# Patient Record
Sex: FEMALE | Race: WHITE | Employment: FULL TIME | URBAN - METROPOLITAN AREA
[De-identification: names, ages, dates, MRNs, and addresses within clinical notes are randomized per-mention and may not be internally consistent; named-entity substitution may affect disease eponyms.]

---

## 2017-03-30 ENCOUNTER — OFFICE VISIT (OUTPATIENT)
Dept: FAMILY MEDICINE | Facility: CLINIC | Age: 52
End: 2017-03-30
Payer: COMMERCIAL

## 2017-03-30 VITALS
DIASTOLIC BLOOD PRESSURE: 86 MMHG | BODY MASS INDEX: 23.74 KG/M2 | SYSTOLIC BLOOD PRESSURE: 124 MMHG | HEIGHT: 62 IN | HEART RATE: 62 BPM | WEIGHT: 129 LBS | TEMPERATURE: 98 F | OXYGEN SATURATION: 99 %

## 2017-03-30 DIAGNOSIS — L98.9 SKIN LESION: Primary | ICD-10-CM

## 2017-03-30 PROCEDURE — 99213 OFFICE O/P EST LOW 20 MIN: CPT | Performed by: PHYSICIAN ASSISTANT

## 2017-03-30 NOTE — PROGRESS NOTES
SUBJECTIVE:                                                    Chloe Elliott is a 51 year old female who presents to clinic today for the following health issues:      Skin lesion     Onset: for a long time now per pt    Description:   Location: R back shoulder region, has had sunburns in that area  Character: round  Itching (Pruritis): no     Progression of Symptoms:  same    Accompanying Signs & Symptoms:  Fever: no   Body aches or joint pain: no   Sore throat symptoms: no   Recent cold symptoms: no    History:   Previous similar rash: YES    Precipitating factors:   Exposure to similar rash: no   New exposures: None   Recent travel: no     Alleviating factors:  none     Therapies Tried and outcome: none      Was seen in august and had frozen with liquid nitrogen.  That lesion went away.  Now has a different lesion that she would like me to check, does not bother her.   Possibly has grown she is not sure.   No fevers, chills, or weight loss.   Admits to a lot of sun exposure in her life. Only wears sunscreen on face.           Problem list and histories reviewed & adjusted, as indicated.  Additional history: as documented    Patient Active Problem List   Diagnosis     CARDIOVASCULAR SCREENING; LDL GOAL LESS THAN 160     Status post LASIK surgery     Past Surgical History:   Procedure Laterality Date     BREAST BIOPSY, RT/LT  2008    right - negative     COLONOSCOPY WITH CO2 INSUFFLATION N/A 10/27/2014    neg except min sigmoid diverticuli     HC CONIZATION CERVIX,KNIFE/LASER  1990     LASIK  2001       Social History   Substance Use Topics     Smoking status: Never Smoker     Smokeless tobacco: Never Used      Comment: Lives in smoke free household     Alcohol use 2.5 oz/week     5 drink(s) per week     Family History   Problem Relation Age of Onset     Cardiovascular Mother      CANCER Father      stomach     GASTROINTESTINAL DISEASE Father      Pyelonephritis     Breast Cancer Paternal Grandmother      DIABETES  "Paternal Grandmother      Cancer - colorectal No family hx of      Prostate Cancer No family hx of      Hypertension No family hx of      CEREBROVASCULAR DISEASE No family hx of      Thyroid Disease No family hx of      Glaucoma No family hx of      Macular Degeneration No family hx of      CANCER Maternal Uncle      stomach and bone     DIABETES Maternal Aunt      DIABETES Maternal Uncle      DIABETES Maternal Grandmother      DIABETES Maternal Grandfather      DIABETES Paternal Grandfather      Alcohol/Drug Sister      Alcoholism     GASTROINTESTINAL DISEASE Brother      peptic ulcer disease         No current outpatient prescriptions on file.     No Known Allergies    ROS:  Constitutional, HEENT, cardiovascular, pulmonary, gi and gu systems are negative, except as otherwise noted.    OBJECTIVE:                                                    /86  Pulse 62  Temp 98  F (36.7  C) (Oral)  Ht 5' 2\" (1.575 m)  Wt 129 lb (58.5 kg)  LMP 05/27/2014  SpO2 99%  Breastfeeding? No  BMI 23.59 kg/m2  Body mass index is 23.59 kg/(m^2).  GENERAL: healthy, alert and no distress  NECK: no adenopathy, no asymmetry, masses, or scars and thyroid normal to palpation  RESP: lungs clear to auscultation - no rales, rhonchi or wheezes  CV: regular rate and rhythm, normal S1 S2, no S3 or S4, no murmur, click or rub, no peripheral edema and peripheral pulses strong  MS: no gross musculoskeletal defects noted, no edema  SKIN: R posterior shoulder- about 9 mm x 4 mm skin lesion that looks more like a flat sunspot but does have some darker spots in the center.   Lots of freckles elsewhere but not many nevi.          ASSESSMENT/PLAN:                                                    ASSESSMENT / PLAN:  (L98.9) Skin lesion  (primary encounter diagnosis)  Comment: would recommend derm consult and ? Removal  Plan: DERMATOLOGY REFERRAL              Gale Morales PA-C  [facility]      "

## 2017-03-30 NOTE — MR AVS SNAPSHOT
After Visit Summary   3/30/2017    Chloe Elliott    MRN: [id number]           Patient Information     Date Of Birth          1965        Visit Information        Provider Department      3/30/2017 8:40 AM Gale Morales PA-C [facility]        Today's Diagnoses     Skin lesion    -  1      Care Instructions    Schedule pap    Schedule check up with derm        Follow-ups after your visit        Additional Services     DERMATOLOGY REFERRAL       Your provider has referred you to: [facility] - Earlene Hernandez [phone]   [email or web address]  Skin lesion on shoulder and all over skin check please    Please be aware that coverage of these services is subject to the terms and limitations of your health insurance plan.  Call member services at your health plan with any benefit or coverage questions.      Please bring the following with you to your appointment:    (1) Any X-Rays, CTs or MRIs which have been performed.  Contact the facility where they were done to arrange for  prior to your scheduled appointment.    (2) List of current medications  (3) This referral request   (4) Any documents/labs given to you for this referral                  Who to contact     If you have questions or need follow up information about today's clinic visit or your schedule please contact [facility] directly at [phone].  Normal or non-critical lab and imaging results will be communicated to you by MyChart, letter or phone within 4 business days after the clinic has received the results. If you do not hear from us within 7 days, please contact the clinic through MyChart or phone. If you have a critical or abnormal lab result, we will notify you by phone as soon as possible.  Submit refill requests through [employer] or call your pharmacy and they will forward the refill request to us. Please allow 3 business days for your refill to be  "completed.          Additional Information About Your Visit        Care EveryWhere ID     This is your Care EveryWhere ID. This could be used by other organizations to access your [place] medical records  [id number]        Your Vitals Were     Pulse Temperature Height Last Period Pulse Oximetry Breastfeeding?    62 98  F (36.7  C) (Oral) 5' 2\" (1.575 m) 05/27/2014 99% No    BMI (Body Mass Index)                   23.59 kg/m2            Blood Pressure from Last 3 Encounters:   03/30/17 124/86   08/08/16 128/62   07/27/15 126/77    Weight from Last 3 Encounters:   03/30/17 129 lb (58.5 kg)   08/08/16 129 lb (58.5 kg)   07/27/15 126 lb (57.2 kg)              We Performed the Following     DERMATOLOGY REFERRAL        Primary Care Provider Office Phone # Fax #    [facility] [phone] [address] [facility]. [facility] [address]        Thank you!     Thank you for choosing [facility]  for your care. Our goal is always to provide you with excellent care. Hearing back from our patients is one way we can continue to improve our services. Please take a few minutes to complete the written survey that you may receive in the mail after your visit with us. Thank you!             Your Updated Medication List - Protect others around you: Learn how to safely use, store and throw away your medicines at www.disposemymeds.org.      Notice  As of 3/30/2017  9:04 AM    You have not been prescribed any medications.      "

## 2017-03-30 NOTE — Clinical Note
Please abstract the following data from this visit with this patient into the appropriate field in Epic:  Mammogram done on this date: 06/01/2016 (approximately), by this group: [facility] results were normal.

## 2017-03-30 NOTE — NURSING NOTE
"Chief Complaint   Patient presents with     Derm Problem     check skin per pt x for a long time now       Initial /86  Pulse 62  Temp 98  F (36.7  C) (Oral)  Ht 5' 2\" (1.575 m)  Wt 129 lb (58.5 kg)  LMP 05/27/2014  SpO2 99%  Breastfeeding? No  BMI 23.59 kg/m2 Estimated body mass index is 23.59 kg/(m^2) as calculated from the following:    Height as of this encounter: 5' 2\" (1.575 m).    Weight as of this encounter: 129 lb (58.5 kg).  Medication Reconciliation: complete      Papo Barros MA    "

## 2017-06-19 ENCOUNTER — OFFICE VISIT (OUTPATIENT)
Dept: OBGYN | Facility: CLINIC | Age: 52
End: 2017-06-19
Payer: COMMERCIAL

## 2017-06-19 VITALS
DIASTOLIC BLOOD PRESSURE: 82 MMHG | SYSTOLIC BLOOD PRESSURE: 148 MMHG | HEART RATE: 71 BPM | TEMPERATURE: 97 F | WEIGHT: 130 LBS | BODY MASS INDEX: 23.92 KG/M2 | HEIGHT: 62 IN

## 2017-06-19 DIAGNOSIS — Z13.220 LIPID SCREENING: ICD-10-CM

## 2017-06-19 DIAGNOSIS — R41.9 COGNITIVE COMPLAINTS: ICD-10-CM

## 2017-06-19 DIAGNOSIS — Z01.419 ENCOUNTER FOR GYNECOLOGICAL EXAMINATION WITHOUT ABNORMAL FINDING: Primary | ICD-10-CM

## 2017-06-19 DIAGNOSIS — Z13.1 SCREENING FOR DIABETES MELLITUS: ICD-10-CM

## 2017-06-19 DIAGNOSIS — Z12.31 ENCOUNTER FOR SCREENING MAMMOGRAM FOR BREAST CANCER: ICD-10-CM

## 2017-06-19 PROCEDURE — 99396 PREV VISIT EST AGE 40-64: CPT | Performed by: OBSTETRICS & GYNECOLOGY

## 2017-06-19 PROCEDURE — G0145 SCR C/V CYTO,THINLAYER,RESCR: HCPCS | Performed by: OBSTETRICS & GYNECOLOGY

## 2017-06-19 PROCEDURE — 87624 HPV HI-RISK TYP POOLED RSLT: CPT | Performed by: OBSTETRICS & GYNECOLOGY

## 2017-06-19 NOTE — MR AVS SNAPSHOT
After Visit Summary   6/19/2017    Chloe Elliott    MRN: [id number]           Patient Information     Date Of Birth          1965        Visit Information        Provider Department      6/19/2017 2:30 PM Abel Pan MD [facility]        Care Instructions                                                        If you have any questions regarding your visit, Please contact your care team.     [facility]: [phone]    [facility] CLINIC HOURS TELEPHONE NUMBER   Abel Pan M.D.      Marci-    Odilia Sharma-PATRICIA Phillipi-Medical Assistant   Monday-[place]  8:00a.m-4:45 p.m  Wednesday-[place] 8:00a.m-4:45 p.m.  Thursday-[place]  8:00a.m-4:45 p.m.  Friday-[place]  8:00a.m-4:45 p.m. [facility]  [address] [place] [address] [address].  [place], [place] [address] [phone] ask [facility]  [phone] Fax  Imaging [phone]    [facility]  [address] [place] [address] [address].  [place], [place] [address] [phone]    [facility]  [address]  [place], [place] [address] [phone] ask [facility]  [phone] Fax  Imaging [phone]     Urgent Care locations:    [facility] Monday-Friday  5 pm - 9 pm  Saturday and Sunday   9 am - 5 pm    Monday-Friday   11 am - 9 pm  Saturday and Sunday   9 am - 5 pm   [phone]       If you need a medication refill, please contact your pharmacy. Please allow 3 business days for your refill to be completed.  As always, Thank you for trusting us with your healthcare needs!            Follow-ups after your visit        Who to contact     If you have questions or need follow up information about today's clinic visit or your schedule please contact [facility] directly at [phone].  Normal or non-critical lab and imaging results will be communicated to you by [employer]art,  "letter or phone within 4 business days after the clinic has received the results. If you do not hear from us within 7 days, please contact the clinic through [employer] or phone. If you have a critical or abnormal lab result, we will notify you by phone as soon as possible.  Submit refill requests through Cond[employer]i or call your pharmacy and they will forward the refill request to us. Please allow 3 business days for your refill to be completed.          Additional Information About Your Visit        Care EveryWhere ID     This is your Care EveryWhere ID. This could be used by other organizations to access your [place] medical records  [id number]        Your Vitals Were     Pulse Temperature Height Last Period Breastfeeding? BMI (Body Mass Index)    71 97  F (36.1  C) (Oral) 1.575 m (5' 2\") 05/27/2014 No 23.78 kg/m2       Blood Pressure from Last 3 Encounters:   06/19/17 148/82   03/30/17 124/86   08/08/16 128/62    Weight from Last 3 Encounters:   06/19/17 59 kg (130 lb)   03/30/17 58.5 kg (129 lb)   08/08/16 58.5 kg (129 lb)              Today, you had the following     No orders found for display       Primary Care Provider Office Phone # Fax #    [facility] [phone] [address] [facility] [address]        Thank you!     Thank you for choosing [facility]  for your care. Our goal is always to provide you with excellent care. Hearing back from our patients is one way we can continue to improve our services. Please take a few minutes to complete the written survey that you may receive in the mail after your visit with us. Thank you!             Your Updated Medication List - Protect others around you: Learn how to safely use, store and throw away your medicines at www.disposemymeds.org.      Notice  As of 6/19/2017  2:37 PM    You have not been prescribed any medications.      "

## 2017-06-19 NOTE — NURSING NOTE
"Chief Complaint   Patient presents with     Physical     AFE       Initial /82 (BP Location: Right arm, Patient Position: Chair, Cuff Size: Adult Regular)  Pulse 71  Temp 97  F (36.1  C) (Oral)  Ht 1.575 m (5' 2\")  Wt 59 kg (130 lb)  LMP 05/27/2014  Breastfeeding? No  BMI 23.78 kg/m2 Estimated body mass index is 23.78 kg/(m^2) as calculated from the following:    Height as of this encounter: 1.575 m (5' 2\").    Weight as of this encounter: 59 kg (130 lb).  Medication Reconciliation: complete   Valencia Preston CMA      "

## 2017-06-19 NOTE — PATIENT INSTRUCTIONS
If you have any questions regarding your visit, Please contact your care team.     [facility]: [phone]    [facility] CLINIC HOURS TELEPHONE NUMBER   JULISSA Hu-    Odilia Sharma-PATRICIA [place]-Medical Assistant   Monday-[place]  8:00a.m-4:45 p.m  Wednesday-[place] 8:00a.m-4:45 p.m.  Thursday-[place]  8:00a.m-4:45 p.m.  Friday-[place]  8:00a.m-4:45 p.m. [facility]  [address]  [place], [place] [address] [phone] ask [facility]  [phone] Fax  Imaging [phone]    [facility] Labor [facility] Delivery  [address] [place] [address] Dr.  [place], [place] [address] [phone]    [facility]  [address] [place] [address]  [phone] ask [facility]  [phone] Fax  Imaging [phone]     Urgent Care locations:    [place]        [place] Monday-Friday  5 pm - 9 pm  Saturday and Sunday   9 am - 5 pm    Monday-Friday   11 am - 9 pm  Saturday and Sunday   9 am - 5 pm   [phone]       If you need a medication refill, please contact your pharmacy. Please allow 3 business days for your refill to be completed.  As always, Thank you for trusting us with your healthcare needs!

## 2017-06-20 NOTE — PROGRESS NOTES
Chloe Elliott is a 52 year old year old who presents for annual exam. Patient's last menstrual period was 05/27/2014.    HPI: Doing well.  Now in menopause. Had stopped OC and no menses for a yr. Few hot flashes now.   Significant interval changes: none.  Current significant symptoms: none.  Other problems or concerns: some memory lapses or cognitive difficulties at times.   Denies interference in daily activities. Wine 1-2 /d. CAGE neg. Notes sister has alcoholism.   Harder to keep weight under control but actually stable.   Home life and family doing well.     Past medical, obstetrical, surgical, family and social history reviewed and as noted or updated in chart.     Allergies, meds and supplements are as noted or updated in chart.     ROS:     Systems reviewed include constitutional; breast;                 cardiac; respiratory; gastrointestinal; genitourinary;                                musculoskeletal; integumentary; psychological;                                hematologic/lymphatic and endocrine.                  These systems were negative for significant symptoms except                 for the following additional: none ; see HPI.                                Exam:  VS as noted.                  Neck, nodes, breasts, abd and pelvis were                             normal or negative except for, or in particular noting, the following                pertinent findings: none.      Assessment: Gyn exam. Problem List updated.    Plan and Recommendations: Symptoms, problems and concerns reviewed. Health habits and vaccinations reviewed. Calcium/Vitamin D and multi-vitamin supplements instructions given. Breast/skin self-exam awareness. Screening tests including limitations discussed. Follow-up visits discussed. See orders. Future fasting labs. Follow BP. RTC 1 year.    Chloe was seen today for physical.    Diagnoses and all orders for this visit:    Encounter for gynecological examination without abnormal  finding  -     Pap imaged thin layer screen with HPV - recommended age 30 - 65 years (select HPV order below)  -     HPV High Risk Types DNA Cervical    Lipid screening  -     Lipid Profile; Future    Screening for diabetes mellitus  -     Hemoglobin A1c; Future    Cognitive complaints  -     Vitamin B12; Future  -     CBC with platelets; Future  -     Vitamin D Deficiency; Future  -     Comprehensive metabolic panel (BMP + Alb, Alk Phos, ALT, AST, Total. Bili, TP); Future  -     TSH; Future    Encounter for screening mammogram for breast cancer  -     MA SCREENING DIGITAL BILAT; Future        Abel Pan MD

## 2017-06-21 LAB
COPATH REPORT: NORMAL
PAP: NORMAL

## 2017-06-23 LAB
FINAL DIAGNOSIS: NORMAL
HPV HR 12 DNA CVX QL NAA+PROBE: NEGATIVE
HPV16 DNA SPEC QL NAA+PROBE: NEGATIVE
HPV18 DNA SPEC QL NAA+PROBE: NEGATIVE
SPECIMEN DESCRIPTION: NORMAL

## 2017-06-30 ENCOUNTER — TRANSFERRED RECORDS (OUTPATIENT)
Dept: HEALTH INFORMATION MANAGEMENT | Facility: CLINIC | Age: 52
End: 2017-06-30

## 2017-07-06 ENCOUNTER — TRANSFERRED RECORDS (OUTPATIENT)
Dept: HEALTH INFORMATION MANAGEMENT | Facility: CLINIC | Age: 52
End: 2017-07-06

## 2018-09-04 ENCOUNTER — TRANSFERRED RECORDS (OUTPATIENT)
Dept: HEALTH INFORMATION MANAGEMENT | Facility: CLINIC | Age: 53
End: 2018-09-04

## 2019-07-22 ENCOUNTER — TELEPHONE (OUTPATIENT)
Dept: OBGYN | Facility: CLINIC | Age: 54
End: 2019-07-22

## 2019-07-22 ENCOUNTER — OFFICE VISIT (OUTPATIENT)
Dept: OBGYN | Facility: CLINIC | Age: 54
End: 2019-07-22
Payer: COMMERCIAL

## 2019-07-22 VITALS
DIASTOLIC BLOOD PRESSURE: 66 MMHG | HEIGHT: 62 IN | HEART RATE: 73 BPM | SYSTOLIC BLOOD PRESSURE: 157 MMHG | BODY MASS INDEX: 24.48 KG/M2 | OXYGEN SATURATION: 98 % | WEIGHT: 133 LBS

## 2019-07-22 DIAGNOSIS — N94.9 ADNEXAL CYST: ICD-10-CM

## 2019-07-22 DIAGNOSIS — Z01.411 ENCOUNTER FOR GYNECOLOGICAL EXAMINATION WITH ABNORMAL FINDING: Primary | ICD-10-CM

## 2019-07-22 PROCEDURE — 99396 PREV VISIT EST AGE 40-64: CPT | Performed by: OBSTETRICS & GYNECOLOGY

## 2019-07-22 ASSESSMENT — MIFFLIN-ST. JEOR: SCORE: 1148.12

## 2019-07-22 NOTE — TELEPHONE ENCOUNTER
US order faxed to [facility] at the number provided. Told to contact patient to schedule.    Valencia Preston CMA

## 2019-07-22 NOTE — TELEPHONE ENCOUNTER
[facility] Call Center    Phone Message    May a detailed message be left on voicemail: no    Reason for Call: Order(s): Other:   Reason for requested: US to go to [facility] Earlene Rapp   Date needed: Today  Provider name: Viviane.  Fax: [phone]  Phone: [phone]      Action Taken: Message routed to:  [facility] p [phone]

## 2019-07-22 NOTE — PATIENT INSTRUCTIONS
If you have any questions regarding your visit, Please contact your care team.     [facility]: [phone]  [facility] CLINIC HOURS TELEPHONE NUMBER       Abel Pan M.D.        Marci-    Rika Birmingham-Medical Assistant       Monday-[place]  8:00a.m-4:45 p.m  Tuesday-[place]  9:00a.m-4:00 p.m  Wednesday-[place] 8:00a.m-4:45 p.m.  Thursday-[place]  8:00a.m-4:45 p.m.  Friday-[place]  8:00a.m-4:45 p.m. [facility]  [address] [place] [address]  [phone] ask for [facility]  [phone] Fax  Imaging [phone]    [facility] Labor and Delivery  [address] [place] [address] [place] [address]  [phone]    [facility]  [address] [place] [address]  [phone] ask [facility]  [phone] Fax  Imaging [phone]     Urgent Care locations:    [facility] Monday-Friday  5 pm - 9 pm  Saturday and Sunday   9 am - 5 pm  Monday-Friday   11 am - 9 pm  Saturday and Sunday   9 am - 5 pm   [phone]   If you need a medication refill, please contact your pharmacy. Please allow 3 business days for your refill to be completed.  As always, Thank you for trusting us with your healthcare needs!

## 2019-07-23 ENCOUNTER — TRANSFERRED RECORDS (OUTPATIENT)
Dept: HEALTH INFORMATION MANAGEMENT | Facility: CLINIC | Age: 54
End: 2019-07-23

## 2019-07-24 NOTE — PROGRESS NOTES
"Chloe Elliott is a 54 year old year old who presents for annual exam. Patient's last menstrual period was 05/27/2014.    HPI: Doing fairly well.  Postmenopausal now for over a year. Min hot flashes.   Significant interval changes: none.  Current significant symptoms: none.  Other problems or concerns: hemorrhoids.   NSA x yrs.  Family doing well.     Past medical, obstetrical, surgical, family and social history reviewed and as noted or updated in chart.     Allergies, meds and supplements are as noted or updated in chart.     ROS:     Systems reviewed include constitutional; breast;                 cardiac; respiratory; gastrointestinal; genitourinary;                                musculoskeletal; integumentary; psychological;                                hematologic/lymphatic and endocrine.                  These systems were negative for significant symptoms except                 for the following additional: none ; see HPI.                                Exam:  VS as noted./66 (BP Location: Left arm, Patient Position: Chair, Cuff Size: Adult Regular)   Pulse 73   Ht 1.577 m (5' 2.1\")   Wt 60.3 kg (133 lb)   LMP 05/27/2014   SpO2 98%   Breastfeeding? No   BMI 24.25 kg/m                      Neck, nodes, breasts, abd and pelvis were                             normal or negative except for, or in particular noting, the following                pertinent findings: tender cystic 4 cm round left adnexal mass.      Assessment: Gyn exam. Problem List updated.    Plan and Recommendations: Symptoms, problems and concerns reviewed. Health habits and vaccinations reviewed. Calcium/Vitamin D and multi-vitamin supplements instructions given. Breast/skin self-exam awareness. Screening tests including limitations discussed. Follow-up visits discussed. See orders. Fasting labs for future. Check pelvic ultrasound. Continue yearly mammograms. Colonoscopy due in 5 yrs. Pap/HRHPV due in 1-3 yrs. RTC 1 year " also.  Tdap due but will defer today.   Home BP usually normal but high systolic today- observe and follow.     Diagnoses and all orders for this visit:    Encounter for gynecological examination with abnormal finding  -     Lipid Profile; Future  -     Hemoglobin A1c; Future  -     Vitamin B12; Future  -     TSH; Future  -     CBC with platelets; Future  -     Comprehensive metabolic panel (BMP + Alb, Alk Phos, ALT, AST, Total. Bili, TP); Future    Adnexal cyst  -     US Pelvic Complete with Transvaginal; Future        Abel Pan MD

## 2019-07-31 ENCOUNTER — TELEPHONE (OUTPATIENT)
Dept: OBGYN | Facility: CLINIC | Age: 54
End: 2019-07-31

## 2019-07-31 NOTE — TELEPHONE ENCOUNTER
Reason for Call:  Other     Detailed comments: Patient had an ultrasound done on 7/23/2019 and has not received the results yet.    Phone Number Patient can be reached at: Home number on file [phone] (home)    Best Time: any    Can we leave a detailed message on this number? YES    Call taken on 7/31/2019 at 8:16 AM by Cayla Linares

## 2019-07-31 NOTE — TELEPHONE ENCOUNTER
Pt called back.  She states she spoke with [facility] and they faxed the results last Wednesday.  She also asked that they refax her US results again today.  I explained to Chloe that I am not at the  clinic as I do triage remotely so I was unsure if we had received the fax.  I explained to her that I will route to Dr. Pan to see if he has reviewed the results and to make him aware that they have been faxed for his review.  I told her that we will contact her once Dr. Pan has had a chance to review them.  Pt verbalized understanding and agreed to plan.    Adrianne Amlanzar RN

## 2019-07-31 NOTE — TELEPHONE ENCOUNTER
Looks like pt had a pelvic US at [facility].  We have not received the results yet.  Called pt to let her know this.  Left her a detailed message informing her that we have not seen the results and suggested she call them to make sure they are faxing Dr. Pan the results as he is the one who ordered it.    Adrianne Almanzar RN

## 2019-08-01 ENCOUNTER — TELEPHONE (OUTPATIENT)
Dept: OBGYN | Facility: CLINIC | Age: 54
End: 2019-08-01

## 2019-08-01 NOTE — TELEPHONE ENCOUNTER
Reason for call:  Other   Patient called regarding (reason for call): call back  Additional comments: patient is calling to discuss her ultrasound results    Phone number to reach patient:  Home number on file [phone] (home)    Best Time:  any    Can we leave a detailed message on this number?  YES

## 2019-08-01 NOTE — TELEPHONE ENCOUNTER
Faxed results were located in mailbox of Dr. Pan at [facility]. Dr. aPn doesn't return to [facility] until Monday 8/12. Copy made and sent to scanning. RN aked covering provider, Dr. Rhodes, in [place] to read and interpret results. Per Dr. Rhodes, nothing concerning noted and Dr. Pan should follow up with patient. ultra sound  results indicate patient should get a f/u ultra sound in one year.    RN called and spoke with patient, relayed there are no concerning findings and she can expect Dr. Pan to follow up with her after 8/12.     Patient verbalized understanding and agreed to plan.   No further questions at this time.     Dian Suarez RN on 8/1/2019 at 8:44 AM

## 2019-08-05 NOTE — TELEPHONE ENCOUNTER
I noted this routing message. Faxed report is at [facility]- Please page me with the report.   Abel Pan MD

## 2019-08-05 NOTE — TELEPHONE ENCOUNTER
Please check to see if the faxed report was received at [facility] while I was away and let me know if it was addressed and patient notified. Page me with report as needed.   Abel Pan MD

## 2019-08-05 NOTE — TELEPHONE ENCOUNTER
Note      Faxed results were located in mailbox of Dr. Pan at [facility]. Dr. Pan doesn't return to [facility] until Monday 8/12. Copy made and sent to scanning. RN aked covering provider, Dr. Rhodes, in [place] to read and interpret results. Per Dr. Rhodes, nothing concerning noted and Dr. Pan should follow up with patient. ultra sound  results indicate patient should get a f/u ultra sound in one year.     RN called and spoke with patient, relayed there are no concerning findings and she can expect Dr. Pan to follow up with her after 8/12.      Patient verbalized understanding and agreed to plan.   No further questions at this time.      Dian Suarez RN on 8/1/2019 at 8:44 AM           Routing to Dr. Pan.     Марина Marte RN on 8/5/2019 at 12:21 PM

## 2019-08-09 NOTE — TELEPHONE ENCOUNTER
I have reviewed the scanned ultrasound report from [facility]. Small benign appearing cyst in the left ovary consistent with exam findings. No other concerning features. Advise follow-up pelvic ultrasound in 6-12 months. Please notify for me.     Please remind patient to do fasting labs at her convenience (orders in place), Tdap vaccine may be done as a nursing visit or next yr, observe and follow blood pressure.     Abel Pan MD

## 2019-08-09 NOTE — TELEPHONE ENCOUNTER
Unable to reach patient via phone. Left message to call clinic back at [phone] and ask for Women[facility].    Dian Suarez RN on 8/9/2019 at 11:23 AM

## 2019-08-12 ENCOUNTER — TELEPHONE (OUTPATIENT)
Dept: OBGYN | Facility: CLINIC | Age: 54
End: 2019-08-12

## 2019-08-12 NOTE — TELEPHONE ENCOUNTER
"Patient returned call to clinic on 8/12 0810 am   \"May a detailed message be left on voicemail: yes    Reason for Call: Other: Pt returned voicemail from 8/9/2019.        She is unable to answer her cell phone throughout the day while at work.     Please call her work number: [phone]\"    RN attempted to call work phone x2 and only got a busy signal. RN called mobile number and left detailed message, relaying Dr. Pan' message below as well as a call back number to clinic with any further questions or concerns.     Dian Suarez, RN on 8/12/2019 at 10:19 AM    "

## 2019-08-12 NOTE — TELEPHONE ENCOUNTER
Please see telephone encounter from 7/31/19 with Dr. Pan' result note regarding ultra sound results.     Dian Suarez RN on 8/12/2019 at 8:25 AM

## 2019-08-12 NOTE — TELEPHONE ENCOUNTER
[facility] Call Center    Phone Message    May a detailed message be left on voicemail: yes    Reason for Call: Other: Pt returned voicemail from 8/9/2019.       She is unable to answer her cell phone throughout the day while at work.    Please call her work number: [phone]    Action Taken: Message routed to:  [facility] p [phone]

## 2019-08-15 ENCOUNTER — MYC MEDICAL ADVICE (OUTPATIENT)
Dept: OBGYN | Facility: CLINIC | Age: 54
End: 2019-08-15

## 2019-08-15 NOTE — TELEPHONE ENCOUNTER
Will route to PA pool to see if they are able to start prior authorization before we place referral    Chikis Burleson  Women's Health

## 2019-08-15 NOTE — TELEPHONE ENCOUNTER
See messages. Please check on any pre-authorization or insurance requirements for patient first and place a referral to [facility] for a postmenopausal left ovarian cyst. Patient may see Dr. Jayy Stover or Dr. Maris Nicole in Gyn Surg/Onc if available. I will sign any forms. Please notify patient when completed.  Abel Pan MD

## 2019-08-21 NOTE — TELEPHONE ENCOUNTER
The [facility] Team currently only works on Prior Authorizations for medications that processed through the patient's pharmacy benefits. I am not sure how to work on Pre-Certs/Pre-Auths for referrals for patient's to see another provider/clinic.  I don't feel comfortable as it is for a clinic service.    I did look at the card and there is a phone number on the back for Pre-Certifications for Out of Network Providers: (Local) [phone], (Toll-Free) [phone].  I would recommend that a clinic representative call that number.        Sorry for the inconvenience that this will cause.  Lambert Feliciano, [facility]  Central PA Team

## 2019-08-22 NOTE — TELEPHONE ENCOUNTER
Called insurance to verify any pre- authorizations needed for patient to see the providers that Dr. Pan mentioned. Patient can go to [facility] and are directly connected with insurance.  Patient has open acces plan which makes her able to schedule without a referral - the provider may need to place one if [facility] requires it.    Ref:# [id number] [place] representative    Called and left message for patient to return call to inform of update with insurance     Chikis Burleson  Women's Health

## 2019-08-28 DIAGNOSIS — N94.9 ADNEXAL CYST: ICD-10-CM

## 2019-08-28 DIAGNOSIS — Z01.411 ENCOUNTER FOR GYNECOLOGICAL EXAMINATION WITH ABNORMAL FINDING: ICD-10-CM

## 2019-08-28 LAB
ALBUMIN SERPL-MCNC: 3.9 G/DL (ref 3.4–5)
ALP SERPL-CCNC: 72 U/L (ref 40–150)
ALT SERPL W P-5'-P-CCNC: 20 U/L (ref 0–50)
ANION GAP SERPL CALCULATED.3IONS-SCNC: 4 MMOL/L (ref 3–14)
AST SERPL W P-5'-P-CCNC: 14 U/L (ref 0–45)
BILIRUB SERPL-MCNC: 0.5 MG/DL (ref 0.2–1.3)
BUN SERPL-MCNC: 16 MG/DL (ref 7–30)
CALCIUM SERPL-MCNC: 9.3 MG/DL (ref 8.5–10.1)
CANCER AG125 SERPL-ACNC: 22 U/ML (ref 0–30)
CHLORIDE SERPL-SCNC: 105 MMOL/L (ref 94–109)
CHOLEST SERPL-MCNC: 201 MG/DL
CO2 SERPL-SCNC: 32 MMOL/L (ref 20–32)
CREAT SERPL-MCNC: 0.71 MG/DL (ref 0.52–1.04)
ERYTHROCYTE [DISTWIDTH] IN BLOOD BY AUTOMATED COUNT: 13.1 % (ref 10–15)
GFR SERPL CREATININE-BSD FRML MDRD: >90 ML/MIN/{1.73_M2}
GLUCOSE SERPL-MCNC: 96 MG/DL (ref 70–99)
HBA1C MFR BLD: 5 % (ref 0–5.6)
HCT VFR BLD AUTO: 45.8 % (ref 35–47)
HDLC SERPL-MCNC: 94 MG/DL
HGB BLD-MCNC: 14.9 G/DL (ref 11.7–15.7)
LDLC SERPL CALC-MCNC: 97 MG/DL
MCH RBC QN AUTO: 32.1 PG (ref 26.5–33)
MCHC RBC AUTO-ENTMCNC: 32.5 G/DL (ref 31.5–36.5)
MCV RBC AUTO: 99 FL (ref 78–100)
NONHDLC SERPL-MCNC: 107 MG/DL
PLATELET # BLD AUTO: 291 10E9/L (ref 150–450)
POTASSIUM SERPL-SCNC: 4.5 MMOL/L (ref 3.4–5.3)
PROT SERPL-MCNC: 7.3 G/DL (ref 6.8–8.8)
RBC # BLD AUTO: 4.64 10E12/L (ref 3.8–5.2)
SODIUM SERPL-SCNC: 141 MMOL/L (ref 133–144)
TRIGL SERPL-MCNC: 52 MG/DL
TSH SERPL DL<=0.005 MIU/L-ACNC: 3.12 MU/L (ref 0.4–4)
VIT B12 SERPL-MCNC: 537 PG/ML (ref 193–986)
WBC # BLD AUTO: 6.4 10E9/L (ref 4–11)

## 2019-08-28 PROCEDURE — 82607 VITAMIN B-12: CPT | Performed by: OBSTETRICS & GYNECOLOGY

## 2019-08-28 PROCEDURE — 80061 LIPID PANEL: CPT | Performed by: OBSTETRICS & GYNECOLOGY

## 2019-08-28 PROCEDURE — 86304 IMMUNOASSAY TUMOR CA 125: CPT | Performed by: OBSTETRICS & GYNECOLOGY

## 2019-08-28 PROCEDURE — 36415 COLL VENOUS BLD VENIPUNCTURE: CPT | Performed by: OBSTETRICS & GYNECOLOGY

## 2019-08-28 PROCEDURE — 80053 COMPREHEN METABOLIC PANEL: CPT | Performed by: OBSTETRICS & GYNECOLOGY

## 2019-08-28 PROCEDURE — 84443 ASSAY THYROID STIM HORMONE: CPT | Performed by: OBSTETRICS & GYNECOLOGY

## 2019-08-28 PROCEDURE — 83036 HEMOGLOBIN GLYCOSYLATED A1C: CPT | Performed by: OBSTETRICS & GYNECOLOGY

## 2019-08-28 PROCEDURE — 85027 COMPLETE CBC AUTOMATED: CPT | Performed by: OBSTETRICS & GYNECOLOGY

## 2019-09-06 ENCOUNTER — MYC MEDICAL ADVICE (OUTPATIENT)
Dept: OBGYN | Facility: CLINIC | Age: 54
End: 2019-09-06

## 2020-02-23 ENCOUNTER — HEALTH MAINTENANCE LETTER (OUTPATIENT)
Age: 55
End: 2020-02-23

## 2020-12-12 ENCOUNTER — HEALTH MAINTENANCE LETTER (OUTPATIENT)
Age: 55
End: 2020-12-12

## 2021-09-26 ENCOUNTER — HEALTH MAINTENANCE LETTER (OUTPATIENT)
Age: 56
End: 2021-09-26

## 2021-11-23 ENCOUNTER — TRANSFERRED RECORDS (OUTPATIENT)
Dept: HEALTH INFORMATION MANAGEMENT | Facility: CLINIC | Age: 56
End: 2021-11-23
Payer: COMMERCIAL

## 2022-01-16 ENCOUNTER — HEALTH MAINTENANCE LETTER (OUTPATIENT)
Age: 57
End: 2022-01-16

## 2023-01-08 ENCOUNTER — HEALTH MAINTENANCE LETTER (OUTPATIENT)
Age: 58
End: 2023-01-08

## 2023-02-13 ENCOUNTER — TRANSFERRED RECORDS (OUTPATIENT)
Dept: HEALTH INFORMATION MANAGEMENT | Facility: CLINIC | Age: 58
End: 2023-02-13
Payer: COMMERCIAL

## 2023-04-23 ENCOUNTER — HEALTH MAINTENANCE LETTER (OUTPATIENT)
Age: 58
End: 2023-04-23

## 2025-03-28 ENCOUNTER — TRANSFERRED RECORDS (OUTPATIENT)
Dept: HEALTH INFORMATION MANAGEMENT | Facility: CLINIC | Age: 60
End: 2025-03-28
Payer: COMMERCIAL